# Patient Record
Sex: MALE | Race: ASIAN | NOT HISPANIC OR LATINO
[De-identification: names, ages, dates, MRNs, and addresses within clinical notes are randomized per-mention and may not be internally consistent; named-entity substitution may affect disease eponyms.]

---

## 2023-05-16 ENCOUNTER — NON-APPOINTMENT (OUTPATIENT)
Age: 23
End: 2023-05-16

## 2023-05-16 ENCOUNTER — APPOINTMENT (OUTPATIENT)
Dept: ORTHOPEDIC SURGERY | Facility: CLINIC | Age: 23
End: 2023-05-16
Payer: MEDICAID

## 2023-05-16 ENCOUNTER — EMERGENCY (EMERGENCY)
Facility: HOSPITAL | Age: 23
LOS: 1 days | Discharge: HOME | End: 2023-05-16
Attending: STUDENT IN AN ORGANIZED HEALTH CARE EDUCATION/TRAINING PROGRAM | Admitting: STUDENT IN AN ORGANIZED HEALTH CARE EDUCATION/TRAINING PROGRAM
Payer: MEDICAID

## 2023-05-16 VITALS
DIASTOLIC BLOOD PRESSURE: 57 MMHG | HEART RATE: 62 BPM | OXYGEN SATURATION: 97 % | SYSTOLIC BLOOD PRESSURE: 131 MMHG | RESPIRATION RATE: 18 BRPM | TEMPERATURE: 98 F

## 2023-05-16 VITALS
SYSTOLIC BLOOD PRESSURE: 134 MMHG | HEIGHT: 71 IN | DIASTOLIC BLOOD PRESSURE: 74 MMHG | WEIGHT: 210 LBS | HEART RATE: 69 BPM | BODY MASS INDEX: 29.4 KG/M2

## 2023-05-16 VITALS
SYSTOLIC BLOOD PRESSURE: 132 MMHG | TEMPERATURE: 98 F | HEART RATE: 64 BPM | RESPIRATION RATE: 16 BRPM | DIASTOLIC BLOOD PRESSURE: 82 MMHG | OXYGEN SATURATION: 99 %

## 2023-05-16 DIAGNOSIS — S62.623A DISPLACED FRACTURE OF MIDDLE PHALANX OF LEFT MIDDLE FINGER, INITIAL ENCOUNTER FOR CLOSED FRACTURE: ICD-10-CM

## 2023-05-16 PROBLEM — Z00.00 ENCOUNTER FOR PREVENTIVE HEALTH EXAMINATION: Status: ACTIVE | Noted: 2023-05-16

## 2023-05-16 PROCEDURE — 73130 X-RAY EXAM OF HAND: CPT | Mod: 26,LT

## 2023-05-16 PROCEDURE — 99284 EMERGENCY DEPT VISIT MOD MDM: CPT

## 2023-05-16 PROCEDURE — 99203 OFFICE O/P NEW LOW 30 MIN: CPT

## 2023-05-16 RX ORDER — NAPROXEN 500 MG/1
500 TABLET ORAL
Qty: 20 | Refills: 1 | Status: ACTIVE | COMMUNITY
Start: 2023-05-16 | End: 1900-01-01

## 2023-05-16 NOTE — ED PROVIDER NOTE - NSFOLLOWUPINSTRUCTIONS_ED_ALL_ED_FT
TAKE TYLENOL 650mg EVERY 4 HOURS AS NEEDED FOR MILD PAIN  TAKE MOTRIN 600mg EVERY 6 HOURS AS NEEDED FOR MODERATE PAIN    FOLLOW UP WITH ORTHOPEDICS/HAND SURGERY as Scheduled with the Discharge Center    RETURN TO THE EMERGENCY DEPARTMENT FOR ANY WORSENING SYMPTOMS.

## 2023-05-16 NOTE — ED PROVIDER NOTE - PHYSICAL EXAMINATION
General: Appears well and nontoxic  Mentation: AAO x 3  psych: mood appropriate  HEENT: airway patent, conjunctivae clear bilaterally, NANI  Resp: symmetric chest rise, no resp distress  Neuro: sensation and motor function grossly intact  Skin: no cyanosis, no jaundice  MSK: minimal swelling of left middle finger at the PIP  Lymph/Vasc: no LE edema

## 2023-05-16 NOTE — HISTORY OF PRESENT ILLNESS
[de-identified] : ZOEY HEATON  is a 22 year year old right-hand-dominant M who presents with left middle finger pain.  He says that on Friday he was playing basketball and is unsure exactly what happened but thinks that he may have caught the ball too hard and felt pain in his middle finger PIP joint.  He says that the pain and swelling worsened over the weekend and he was unable to fully bend the finger.  Therefore he went to the ER today where x-rays demonstrated a fracture.  He was put into a AlumaFoam splint and told to follow-up with orthopedics.  He works as a pharmacy tech and enjoys playing basketball.\par

## 2023-05-16 NOTE — ED ADULT NURSE NOTE - OBJECTIVE STATEMENT
Patient received to intake room 10C A&Ox4, ambulatory coming to the ED for complaints of 3rd digit finger pain on the right hand. Patient states to have been playing basketball and then his "finger became swollen and he began to have trouble bending it." swelling, mild redness and decreased ROM of the 3rd digit noted. Patient denies any other complaints at this time. RR equal and unlabored. Awaiting imaging. Care plan continued. Comfort measures provided. Safety maintained.

## 2023-05-16 NOTE — DISCUSSION/SUMMARY
[de-identified] : Left middle finger middle phalanx base fracture, mildly displaced.  Discussed with him that fracture appears mildly displaced and typically these can be treated nonoperatively.  We discussed that this would include the use of the AlumaFoam splint for 6 weeks.  For the first 2 weeks he should wear the splint all day and at night in order to immobilize the finger.  After 2 weeks he will come back and see me for repeat evaluation and repeat x-rays.  As long as the fracture is stable, he may start to remove the splint a few times a day to work on finger range of motion.  We discussed that it takes approximately 3 months for bones to heal, but he may be able to return to sports after 6 to 8 weeks depending on his pain and swelling.  He may work with modifications in order to keep the splint on.  I will prescribe him naproxen 5 mg twice daily to be taken with food for the next 10 days to help with the acute pain and inflammation that he is having.  The patient expressed understanding of his diagnosis and treatment plan and all questions were answered.\par \par This note was generated using dragon medical dictation software.  A reasonable effort has been made for proofreading its contents, but typos may still remain.  If there are any questions or points of clarification needed please notify my office.

## 2023-05-16 NOTE — PHYSICAL EXAM
[de-identified] : General: No apparent distress\par Cardiovascular: extremities warm and well-perfused, no cyanosis\par Pulmonary: non labored respirations\par \par Left hand:\par Swelling and tenderness to palpation about PIP joint.  Mostly tender to palpation about proximal aspect of middle phalanx\par Able to actively flex and extend at MCP and PIP joints, but with pain and is restricted\par Sensation intact light touch throughout middle finger\par Cap refill less than 2 seconds [de-identified] : 3 views of the left middle finger obtained today in the ER demonstrate a base of the middle phalanx fracture, intra-articular with approximately 1.5 mm of displacement.  The joint appears located

## 2023-05-16 NOTE — ED ADULT NURSE NOTE - NSFALLUNIVINTERV_ED_ALL_ED
Bed/Stretcher in lowest position, wheels locked, appropriate side rails in place/Call bell, personal items and telephone in reach/Instruct patient to call for assistance before getting out of bed/chair/stretcher/Non-slip footwear applied when patient is off stretcher/[place] to call system/Physically safe environment - no spills, clutter or unnecessary equipment/Purposeful proactive rounding/Room/bathroom lighting operational, light cord in reach

## 2023-05-16 NOTE — ED PROVIDER NOTE - OBJECTIVE STATEMENT
22-year-old male with no past medical history presenting with finger injury. Patient was playing basketball 5 days ago. Patient felt that the basketball hyperextended his left middle finger. Patient has had pain since. Patient states that he continue playing basketball during that game. Patient was seen at urgent care who did not perform any x-rays. Patient denies using over-the-counter analgesics. Pain is located at left middle PIP.

## 2023-05-16 NOTE — ED PROVIDER NOTE - PATIENT PORTAL LINK FT
You can access the FollowMyHealth Patient Portal offered by [facility] by registering at the following website: [email or web address]. By joining [facility]’s FollowMyHealth portal, you will also be able to view your health information using other applications (apps) compatible with our system.

## 2023-05-16 NOTE — ED PROVIDER NOTE - ATTENDING CONTRIBUTION TO CARE
Dr. Palacios, Attending Physician-  I performed a face to face bedside interview with patient regarding history of present illness, review of symptoms and past medical history. I completed an independent physical exam.  I have discussed patient's plan of care with the resident.    22M, no pmh, who presents with a finger injury. Patient is R hand dominant. Was playing basketball 5 days ago when he believes he may have hyperextended his LMF. Denies any other bodily injury. Now reports diffuse swelling and decreased mobility of that digit. On ROS, denies headaches, fevers, chills, cough, sputum, cp, sob, abdominal pain, nvd. Physical: L Hand: no scaphoid ttp, 2+ radial pulses, SILT over MRU, AIN/PIN/U intact, diffuse swelling of the LMF with decreased mobility of the PIP. No open wounds. Plan: plain films to assess for bony injury.

## 2023-05-16 NOTE — ED PROCEDURE NOTE - ATTENDING CONTRIBUTION TO CARE
Dr. Palacios: I personally supervised this procedure performed by the resident and I agree with the above documentation.

## 2023-05-16 NOTE — ED ADULT NURSE REASSESSMENT NOTE - NS ED NURSE REASSESS COMMENT FT1
Patient is being discharged today. Education provided via teach back method and written materials to patient and pt verbalizes understanding. Medication reconciliation completed. All personal belongings with patient. All tele leads and IV removed from pt as per provider order. No complaints/signs/symptoms of pain, distress, or discomfort at this time.

## 2023-05-16 NOTE — ED PROVIDER NOTE - CLINICAL SUMMARY MEDICAL DECISION MAKING FREE TEXT BOX
22-year-old male presenting with left middle finger pain and swelling after hyperextension from basketball 5 days ago. Patient has not had x-rays yet and is not taking pain medication. Patient was offered pain medication is declining at this time. Will obtain x-rays and splint finger with hand follow-up.

## 2023-05-26 ENCOUNTER — APPOINTMENT (OUTPATIENT)
Dept: ORTHOPEDIC SURGERY | Facility: CLINIC | Age: 23
End: 2023-05-26
Payer: MEDICAID

## 2023-05-26 DIAGNOSIS — M79.646 PAIN IN UNSPECIFIED FINGER(S): ICD-10-CM

## 2023-05-26 PROCEDURE — 99212 OFFICE O/P EST SF 10 MIN: CPT

## 2023-05-26 PROCEDURE — 73140 X-RAY EXAM OF FINGER(S): CPT | Mod: F2

## 2023-05-26 NOTE — DISCUSSION/SUMMARY
[de-identified] : Left middle finger middle phalanx base fracture, mildly displaced.  He should continue with the AlumaFoam splint for another month.  Starting next week, he may take the splint off a few times a day and bend the finger to prevent finger stiffness.  I will see him back in 4 weeks for repeat evaluation and repeat x-rays.  The patient expressed understanding of his diagnosis and treatment plan and all questions were answered.\par \par This note was generated using dragon medical dictation software.  A reasonable effort has been made for proofreading its contents, but typos may still remain.  If there are any questions or points of clarification needed please notify my office.

## 2023-05-26 NOTE — PHYSICAL EXAM
[de-identified] : General: No apparent distress\par Cardiovascular: extremities warm and well-perfused, no cyanosis\par Pulmonary: non labored respirations\par \par Left hand:\par Swelling and tenderness to palpation about PIP joint.  Mostly tender to palpation about proximal aspect of middle phalanx\par Able to actively flex and extend at MCP and PIP joints, but with pain and is restricted\par Sensation intact light touch throughout middle finger\par Cap refill less than 2 seconds [de-identified] : 3 views of the left middle finger obtained today and interpreted by me including AP, lateral, oblique views demonstrate a base of the middle phalanx fracture, intra-articular with approximately 1.2 mm of displacement.  The joint appears located.  The overlying splint obscures finer bony detail

## 2023-05-26 NOTE — HISTORY OF PRESENT ILLNESS
[de-identified] : ZOEY HEATON is a 22 year year old right-hand-dominant M who presents for follow up of left middle finger middle phalanx base fracture, mildly displaced. The injury occurred 10 days ago.  He reports that he still has some pain in the finger, about a 6 out of 10.  He has been wearing the splint as prescribed.  He also has noticed some swelling in it.

## 2023-06-02 ENCOUNTER — APPOINTMENT (OUTPATIENT)
Dept: ORTHOPEDIC SURGERY | Facility: CLINIC | Age: 23
End: 2023-06-02

## 2023-06-30 ENCOUNTER — APPOINTMENT (OUTPATIENT)
Dept: ORTHOPEDIC SURGERY | Facility: CLINIC | Age: 23
End: 2023-06-30

## 2023-07-03 ENCOUNTER — APPOINTMENT (OUTPATIENT)
Dept: ORTHOPEDIC SURGERY | Facility: CLINIC | Age: 23
End: 2023-07-03
Payer: MEDICAID

## 2023-07-03 VITALS — DIASTOLIC BLOOD PRESSURE: 77 MMHG | SYSTOLIC BLOOD PRESSURE: 148 MMHG | HEART RATE: 69 BPM

## 2023-07-03 DIAGNOSIS — S62.623D DISPLACED FRACTURE OF MIDDLE PHALANX OF LEFT MIDDLE FINGER, SUBSEQUENT ENCOUNTER FOR FRACTURE WITH ROUTINE HEALING: ICD-10-CM

## 2023-07-03 PROCEDURE — 73140 X-RAY EXAM OF FINGER(S): CPT | Mod: F2

## 2023-07-03 PROCEDURE — 99212 OFFICE O/P EST SF 10 MIN: CPT

## 2023-08-07 ENCOUNTER — APPOINTMENT (OUTPATIENT)
Dept: ORTHOPEDIC SURGERY | Facility: CLINIC | Age: 23
End: 2023-08-07

## 2025-07-10 ENCOUNTER — APPOINTMENT (OUTPATIENT)
Dept: OTOLARYNGOLOGY | Facility: CLINIC | Age: 25
End: 2025-07-10